# Patient Record
(demographics unavailable — no encounter records)

---

## 2024-11-06 NOTE — ASSESSMENT
[FreeTextEntry1] : Lumbosacral sprain/strain  Plan anti-inflammatories with GI precautions a heating pad and recommend a course of conservative treatment of physical therapy.  I did recommend a course of physical therapy does not respond or gets worse or he develops the symptoms of sciatic again he may ultimately require an MRI.  At this point he has no pain down the legs we will try the physical therapy and follow-up if continues to have symptoms.  All questions were answered they are agreeable with the plan.  Symptoms of Cauda equina were discussed with the patient. Patient was advised if they develop any numbness or tingling down the legs, weakness, or bowel or bladder symptoms they must call the office or go to the ER immediately.  This medical record was created utilizing Dragon voice recognition software.  This software is not perfect and may occasionally create typographical errors which may be reflected in the above medical record.

## 2024-11-06 NOTE — IMAGING
[de-identified] : He is alert and oriented vital signs are stable.  He is able to stand on his toes and stand his heels  Examination lumbar sacral spine reveals middle tenderness palpation evidence for spasm.  He is able to forward flex to 50 degrees extension of the spine of 20 degrees lateral rotation of 30 degrees and lateral flexion of 20 degrees.  He had 5-5 strength bilateral lower extremities 1+ deep tendon reflexes throughout negative straight leg raising.  He had a normal neurologic exam. Normal vascular exam. Normal skin exam. No evidence for open wounds infection or compartment syndrome. [FreeTextEntry1] : X-rays lumbar spine 2 views revealed no fracture or dislocations.

## 2024-11-06 NOTE — HISTORY OF PRESENT ILLNESS
[de-identified] : Patient is a 31-year-old male presents with a couple week history of low back pain.  Denies any injury.  Says the pain is improved about 2 weeks ago he had pain going down the left leg and went to [facility] emergency room was given medications.  He says since that time the pain is improved and there is no longer any pain down the legs.  Denies any numbness or tingling down the legs or any bowel or bladder symptoms.  He says he does have some stiffness in the back.  He is seen with his mother.